# Patient Record
Sex: FEMALE | Race: WHITE | NOT HISPANIC OR LATINO | Employment: FULL TIME | URBAN - METROPOLITAN AREA
[De-identification: names, ages, dates, MRNs, and addresses within clinical notes are randomized per-mention and may not be internally consistent; named-entity substitution may affect disease eponyms.]

---

## 2021-01-25 ENCOUNTER — OFFICE VISIT (OUTPATIENT)
Dept: PAIN MEDICINE | Facility: CLINIC | Age: 36
End: 2021-01-25

## 2021-01-25 VITALS
DIASTOLIC BLOOD PRESSURE: 80 MMHG | WEIGHT: 208 LBS | HEART RATE: 66 BPM | HEIGHT: 67 IN | RESPIRATION RATE: 20 BRPM | BODY MASS INDEX: 32.65 KG/M2 | TEMPERATURE: 96.9 F | SYSTOLIC BLOOD PRESSURE: 120 MMHG | OXYGEN SATURATION: 99 %

## 2021-01-25 DIAGNOSIS — M54.16 LUMBAR RADICULITIS: ICD-10-CM

## 2021-01-25 DIAGNOSIS — M51.36 BULGE OF LUMBAR DISC WITHOUT MYELOPATHY: ICD-10-CM

## 2021-01-25 DIAGNOSIS — M54.16 LUMBAR NERVE ROOT COMPRESSION: Primary | ICD-10-CM

## 2021-01-25 PROBLEM — M51.369 BULGE OF LUMBAR DISC WITHOUT MYELOPATHY: Status: ACTIVE | Noted: 2021-01-25

## 2021-01-25 LAB
POC AMPHETAMINES: NEGATIVE
POC BARBITURATES: NEGATIVE
POC BENZODIAZEPHINES: NEGATIVE
POC COCAINE: NEGATIVE
POC METHADONE: NEGATIVE
POC METHAMPHETAMINE SCREEN URINE: NEGATIVE
POC OPIATES: NEGATIVE
POC OXYCODONE: NEGATIVE
POC PHENCYCLIDINE: NEGATIVE
POC PROPOXYPHENE: NEGATIVE
POC THC: NEGATIVE
POC TRICYCLIC ANTIDEPRESSANTS: NEGATIVE

## 2021-01-25 PROCEDURE — 80305 DRUG TEST PRSMV DIR OPT OBS: CPT | Performed by: NURSE PRACTITIONER

## 2021-01-25 PROCEDURE — 99204 OFFICE O/P NEW MOD 45 MIN: CPT | Performed by: NURSE PRACTITIONER

## 2021-01-25 RX ORDER — MELOXICAM 15 MG/1
TABLET ORAL
COMMUNITY
Start: 2021-01-06 | End: 2023-02-27

## 2021-01-25 RX ORDER — PREDNISONE 20 MG/1
TABLET ORAL
COMMUNITY
Start: 2021-01-03 | End: 2023-02-27

## 2021-01-25 RX ORDER — ALBUTEROL SULFATE 90 UG/1
2 AEROSOL, METERED RESPIRATORY (INHALATION) EVERY 4 HOURS PRN
COMMUNITY

## 2021-01-25 RX ORDER — METAXALONE 800 MG/1
TABLET ORAL
COMMUNITY
Start: 2021-01-03 | End: 2023-02-27

## 2021-01-25 NOTE — PROGRESS NOTES
"CHIEF COMPLAINT  New pt referred from Maribeth Alvarado MD for back pain. Pt sts no back pain, pain starts in right hip and radiates down right leg. Pt sts sharp severe pain when changing positions from sitting to standing, and walking. Pt sts pain started 12/14/20, pt sts she took motrin x 2 weeks for the pain then went to  ER, had XR and was given MDP improved pain x 1 week then saw Dr. Alvarado, had MRI lumbar 1/15/21. Pt sts never had steroid inj for pain. Pt sts no PT.     Subjective   Noelle Rodriguez is a  RHD 35 y.o. female.   She presents to the office for evaluation of back, right hip and right leg pain. She was referred here by Dr Maribeth Alvarado.  Lives with .  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  Does not smoke. Does drink alcohol, 1 glass of wine with dinner 4-5 times per week. Denies any illicit substance use. Denies any family history of alcoholism or addiction. Denies any family history of back problems. Family history is positive for multiple myeloma(mother).    Her pain started approximately 12-14-20. Does not remember any specific injury or traumatic event. Pain continued to worsen. Noted right SI joint pain. Tried Ibuprofen, Yoga, heating pad. Pain worsened in early January. Went to  ER around this time. \"it felt like someone was driving a nail in my hip.\"  Treated with MDP with improvement. Saw Dr Alvarado. Was improving at that time. Also prescribed meloxicam 15 mg daily. No relief with meloxicam. No relief with muscle relaxant.  When steroid pack ended, pain returned to previous pain levels. Dr Alvarado ordered MRI.     Complains of pain in her right low back, right posterior leg. Today her pain is 2/10VAS(currently sitting).  Describes the pain as continuous aching with intermittent sharp and stabbing. The pain can radiate down her right leg. Pain increases with prolonged position, certain positions; pain decreases with stretching, heating pad. No " relief with NSAIDS or muscle relaxants.  Has completed steroids with temporary relief.  Has been trying to perform HEP with temporary relief.  ADL's by self. Denies any bowel or bladder incontinence. Denies any current falls.   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    Has been trying to do home exercises and stretching. Notes temporary mild relief.       Back Pain  This is a new () problem. The current episode started more than 1 month ago. The problem occurs constantly. The problem has been gradually worsening since onset. The pain is present in the lumbar spine. The pain radiates to the right thigh and right knee. The pain is at a severity of 2/10. The pain is moderate. The symptoms are aggravated by bending, position, standing, twisting and sitting. Associated symptoms include headaches (hx migraines), leg pain and numbness. Pertinent negatives include no bladder incontinence, bowel incontinence, chest pain, fever or weakness. She has tried bed rest, chiropractic manipulation, heat, ice, muscle relaxant, NSAIDs and walking for the symptoms. The treatment provided mild relief.      PEG Assessment   What number best describes your pain on average in the past week?5  What number best describes how, during the past week, pain has interfered with your enjoyment of life?5  What number best describes how, during the past week, pain has interfered with your general activity?  5      Current Outpatient Medications:   •  albuterol sulfate  (90 Base) MCG/ACT inhaler, Inhale 2 puffs Every 4 (Four) Hours As Needed for Wheezing., Disp: , Rfl:   •  meloxicam (MOBIC) 15 MG tablet, , Disp: , Rfl:   •  metaxalone (SKELAXIN) 800 MG tablet, , Disp: , Rfl:   •  predniSONE (DELTASONE) 20 MG tablet, , Disp: , Rfl:     The following portions of the patient's history were reviewed and updated as appropriate: allergies, current medications, past family history, past  "medical history, past social history, past surgical history and problem list.    REVIEW OF PERTINENT MEDICAL DATA    Reviewed Dr Alvarado office visit 1-6-21(ORTHO)--patient presents with complaint of pain involving her right SI joint.  This started on December 14, 2020.  There is no traumatic event or change in activity.  Reports pain is getting worse.  Seen in ER over the weekend where they took x-rays of her hip and recommended a Medrol Dosepak.  Did notice improvement with Medrol Dosepak.  She points to her right SI joint where her symptoms are most prevalent.  Plan finish out Medrol Dosepak.  Recommend meloxicam 50 mg once a day.  Plan to see her back if symptoms do not improve.    RAMOS INVENTORY-- 5    Reviewed lumbar MRI--- see below-- reviewed with patient.       Review of Systems   Constitutional: Positive for activity change (dec). Negative for fatigue and fever.   HENT: Negative for congestion.    Eyes: Negative for visual disturbance.   Respiratory: Negative for cough and shortness of breath.    Cardiovascular: Negative for chest pain.   Gastrointestinal: Negative for bowel incontinence, constipation and diarrhea.   Endocrine: Negative for polyuria.   Genitourinary: Negative for bladder incontinence and difficulty urinating.   Musculoskeletal: Positive for arthralgias (right hip) and back pain. Negative for gait problem.   Allergic/Immunologic: Negative for immunocompromised state.   Neurological: Positive for numbness and headaches (hx migraines). Negative for dizziness, weakness and light-headedness.   Psychiatric/Behavioral: Positive for sleep disturbance. Negative for agitation and suicidal ideas. The patient is not nervous/anxious.        Vitals:    01/25/21 1435   BP: 120/80   Pulse: 66   Resp: 20   Temp: 96.9 °F (36.1 °C)   SpO2: 99%   Weight: 94.3 kg (208 lb)   Height: 170.2 cm (67\")   PainSc:   2   PainLoc: Back     Objective   Physical Exam  Vitals signs and nursing note reviewed. "   Constitutional:       Appearance: Normal appearance. She is well-developed.   HENT:      Head: Normocephalic and atraumatic.   Neck:      Musculoskeletal: Full passive range of motion without pain.      Trachea: Trachea normal.   Pulmonary:      Effort: Pulmonary effort is normal.   Musculoskeletal:      Lumbar back: She exhibits decreased range of motion, tenderness and bony tenderness.      Comments: Mild right SI joint tenderness    +SLR on right, negative on left    + femoral stretch on right, negative on left   Skin:     General: Skin is warm and dry.   Neurological:      Mental Status: She is alert.      Gait: Gait normal.      Deep Tendon Reflexes:      Reflex Scores:       Patellar reflexes are 1+ on the right side and 2+ on the left side.       Achilles reflexes are 1+ on the right side and 2+ on the left side.  Psychiatric:         Speech: Speech normal.         Behavior: Behavior normal. Behavior is cooperative.         Thought Content: Thought content normal.         Judgment: Judgment normal.         Assessment/Plan   Diagnoses and all orders for this visit:    1. Lumbar nerve root compression (Primary)  -     Case Request  -     Urine Drug Screen Confirmation - Urine, Clean Catch; Future  -     POC Urine Drug Screen, Triage    2. Lumbar radiculitis  -     Case Request  -     Urine Drug Screen Confirmation - Urine, Clean Catch; Future  -     POC Urine Drug Screen, Triage    3. Bulge of lumbar disc without myelopathy  -     Case Request  -     Urine Drug Screen Confirmation - Urine, Clean Catch; Future  -     POC Urine Drug Screen, Triage        --- Routine UDS in office today as part of monitoring requirements for controlled substances.  The specimen was viewed and the immunoassay result reviewed and is NEGATIVE>.    --- TF JOSE ENRIQUE right L4. No blood thinners. Reviewed the procedure at length with the patient.  Included in the review was expectations, complications, risk and benefits.The procedure was  "described in detail and the risks, benefits and alternatives were discussed with the patient (including but not limited to: bleeding, infection, nerve damage, worsening of pain, inability to perform injection, paralysis, seizures, and death) who agreed to proceed.  Discussed the potential for sedation if warranted/wanted.  The procedure will plan to be performed at [facility] with fluoroscopic guidance(unless ultrasound is indicated) and could potentially have steroids and contrast dye used. Questions were answered and in a way the patient could understand.  Patient verbalized understanding and wishes to proceed.  This intervention will be ordered.  Discussed with patient that all procedures are part of a multimodal plan of care and include either formal PT or a home exercise program.  Patient has no evidence of coagulopathy or current infection.  --- Hold on PT at this time. Pain is significant and interfering with her ADL\"s and ability to work. PLan to proceed with epidural and then proceed with PT once pain is better controlled.  --- Discussed potential need for neurosurgical consultation. Patient wants to wait at this time.   --- Follow-up after procedure or sooner if needed.       JUAN DIEGO REPORT    JUAN DIEGO report has been reviewed and scanned into the patient's chart.    As the clinician, I personally reviewed the JUAN DIEGO from 1-25-21 while the patient was in the office today.            EMR Dragon/Transcription disclaimer:   Much of this encounter note is an electronic transcription/translation of spoken language to printed text. The electronic translation of spoken language may permit erroneous, or at times, nonsensical words or phrases to be inadvertently transcribed; Although I have reviewed the note for such errors, some may still exist.      "

## 2021-01-26 ENCOUNTER — TELEPHONE (OUTPATIENT)
Dept: PAIN MEDICINE | Facility: CLINIC | Age: 36
End: 2021-01-26

## 2021-01-26 NOTE — TELEPHONE ENCOUNTER
Spoke with Latasha at [facility] today she cancelled uds specimen ID: [id number] per your request. Please advise. Thank you.

## 2021-01-28 ENCOUNTER — DOCUMENTATION (OUTPATIENT)
Dept: PAIN MEDICINE | Facility: CLINIC | Age: 36
End: 2021-01-28

## 2021-01-28 ENCOUNTER — OUTSIDE FACILITY SERVICE (OUTPATIENT)
Dept: PAIN MEDICINE | Facility: CLINIC | Age: 36
End: 2021-01-28

## 2021-01-28 PROCEDURE — 64483 NJX AA&/STRD TFRM EPI L/S 1: CPT | Performed by: ANESTHESIOLOGY

## 2021-01-28 NOTE — PROGRESS NOTES
Lumbar Transforaminal Epidural Steroid Injection (one level Unilateral)  [facility]      PREOPERATIVE DIAGNOSIS:  Lumbar Disc Displacement, right Lumbar Radiculopathy and right lumbar root compression    POSTOPERATIVE DIAGNOSIS:  Same as preop diagnosis    PROCEDURE:  CPT 23794 --  Diagnostic Transforaminal Epidural Steroid Injection at the L4 level, on the right     PRE-PROCEDURE DISCUSSION WITH PATIENT:    Risks and complications were discussed with the patient prior to starting the procedure and informed consent was obtained.  We discussed various topics including but not limited to bleeding, infection, injury, nerve injury, paralysis, coma, death, postprocedural painful flare-up, postprocedural site soreness, and a lack of pain relief.  We discussed the diagnostic aspect of transforaminal epidural / selective nerve root blockade.    SURGEON:  Mansoor Pitt MD    REASON FOR PROCEDURE:    Diagnostic injection at this level is needed, Radiating pattern of pain is likely consistent with degenerative changes in the area., Nerve root provocation positivity is noted.   and Acute pain flareup is noted & problematic, and the injection is used to attempt to break the flareup.      SEDATION:  Versed 4mg & Fentanyl 50 mcg IV  ANESTHETIC:  Marcaine 0.25%  STEROID:  Methylprednisolone (DEPO MEDROL) 80mg/ml    DESCRIPTON OF PROCEDURE:  After obtaining informed consent, an I.V. was started in the preoperative area. The patient taken to the operating room and was placed in the prone position with a pillow under the abdomen.  All pressure points were well padded.  EKG, blood pressure, and pulse oximeter were monitored.  The lumbar area was prepped with Chloraprep and draped in a sterile fashion. Under fluoroscopic guidance in an oblique dimension on the above mentioned side, the transverse process of the aforementioned vertebra at the junction of the body at 6 o'clock position was identified. Skin and  subcutaneous tissue was anesthetized with 1% lidocaine. A 22-gauge spinal needle was introduced under fluoroscopic guidance at the above junction into the foramen without parasthesias and into the epidural space. After confirming the position of the needle with PA, lateral, and oblique fluoroscopic views, aspiration was checked and was clear of blood or CSF.  Next, 1 mL of Omnipaque was injected. After seeing adequate spread on the corresponding nerve root, a total volume 3mL of injectate containing 1ml of the above mentioned local anesthetic, 1 ml saline,  and the above mentioned corticosteroid was injected into the epidural space.    The needle was removed intact.  Vital signs were stable throughout.        ESTIMATED BLOOD LOSS:  <5 mL  SPECIMENS:  none    COMPLICATIONS:   No complications were noted., There was no indication of vascular uptake on live injection of contrast dye., There was no indication of intrathecal uptake on live injection of contrast dye., There was not any evidence of dural puncture.   and The patient did not have any signs of postprocedure numbness nor weakness.    TOLERANCE & DISCHARGE CONDITION:    The patient tolerated the procedure well.  The patient was transported to the recovery area without difficulties.  The patient was discharged to home under the care of family in stable and satisfactory condition.    PLAN OF CARE:  1. The patient was given our standard instruction sheet.  2. The patient will Repeat injection in 2-4 wks PRN.  3. The patient will resume all medications as per the medication reconciliation sheet.

## 2021-02-11 ENCOUNTER — TELEPHONE (OUTPATIENT)
Dept: PAIN MEDICINE | Facility: CLINIC | Age: 36
End: 2021-02-11

## 2021-02-11 NOTE — TELEPHONE ENCOUNTER
Caller:ONEYDA RANDLE     Relationship to patient: SELF    Best call back number: [phone]    Chief complaint: REQUESTING SCHEDULE FOR EPIDURAL PROCEDURE- WILL NEED TO BE SCHEDULED AFTER 02/23/21     Type of visit: PROCEDURE    Requested date: ASAP

## 2023-02-27 ENCOUNTER — OFFICE VISIT (OUTPATIENT)
Dept: INTERNAL MEDICINE | Facility: CLINIC | Age: 38
End: 2023-02-27
Payer: COMMERCIAL

## 2023-02-27 VITALS
HEIGHT: 68 IN | WEIGHT: 217.4 LBS | SYSTOLIC BLOOD PRESSURE: 116 MMHG | HEART RATE: 70 BPM | DIASTOLIC BLOOD PRESSURE: 68 MMHG | TEMPERATURE: 97.3 F | RESPIRATION RATE: 16 BRPM | BODY MASS INDEX: 32.95 KG/M2 | OXYGEN SATURATION: 98 %

## 2023-02-27 DIAGNOSIS — E66.9 OBESITY (BMI 30-39.9): Primary | ICD-10-CM

## 2023-02-27 PROCEDURE — 99204 OFFICE O/P NEW MOD 45 MIN: CPT | Performed by: INTERNAL MEDICINE

## 2023-02-27 RX ORDER — TIRZEPATIDE 2.5 MG/.5ML
2.5 INJECTION, SOLUTION SUBCUTANEOUS WEEKLY
Qty: 2 ML | Refills: 0 | Status: SHIPPED | OUTPATIENT
Start: 2023-02-27 | End: 2023-03-07

## 2023-02-27 RX ORDER — SUMATRIPTAN 100 MG/1
100 TABLET, FILM COATED ORAL
COMMUNITY

## 2023-02-28 LAB
ALBUMIN SERPL-MCNC: 4.6 G/DL (ref 3.5–5.2)
ALBUMIN/GLOB SERPL: 1.7 G/DL
ALP SERPL-CCNC: 91 U/L (ref 39–117)
ALT SERPL-CCNC: 24 U/L (ref 1–33)
AST SERPL-CCNC: 15 U/L (ref 1–32)
BASOPHILS # BLD AUTO: 0.04 10*3/MM3 (ref 0–0.2)
BASOPHILS NFR BLD AUTO: 0.5 % (ref 0–1.5)
BILIRUB SERPL-MCNC: 0.3 MG/DL (ref 0–1.2)
BUN SERPL-MCNC: 10 MG/DL (ref 6–20)
BUN/CREAT SERPL: 13.2 (ref 7–25)
CALCIUM SERPL-MCNC: 9.5 MG/DL (ref 8.6–10.5)
CHLORIDE SERPL-SCNC: 101 MMOL/L (ref 98–107)
CHOLEST SERPL-MCNC: 203 MG/DL (ref 0–200)
CO2 SERPL-SCNC: 27.3 MMOL/L (ref 22–29)
CREAT SERPL-MCNC: 0.76 MG/DL (ref 0.57–1)
EGFRCR SERPLBLD CKD-EPI 2021: 103.6 ML/MIN/1.73
EOSINOPHIL # BLD AUTO: 0.12 10*3/MM3 (ref 0–0.4)
EOSINOPHIL NFR BLD AUTO: 1.5 % (ref 0.3–6.2)
ERYTHROCYTE [DISTWIDTH] IN BLOOD BY AUTOMATED COUNT: 13.9 % (ref 12.3–15.4)
GLOBULIN SER CALC-MCNC: 2.7 GM/DL
GLUCOSE SERPL-MCNC: 93 MG/DL (ref 65–99)
HBA1C MFR BLD: 5.7 % (ref 4.8–5.6)
HCT VFR BLD AUTO: 40.4 % (ref 34–46.6)
HDLC SERPL-MCNC: 63 MG/DL (ref 40–60)
HGB BLD-MCNC: 13 G/DL (ref 12–15.9)
IMM GRANULOCYTES # BLD AUTO: 0.02 10*3/MM3 (ref 0–0.05)
IMM GRANULOCYTES NFR BLD AUTO: 0.3 % (ref 0–0.5)
LDLC SERPL CALC-MCNC: 110 MG/DL (ref 0–100)
LYMPHOCYTES # BLD AUTO: 1.69 10*3/MM3 (ref 0.7–3.1)
LYMPHOCYTES NFR BLD AUTO: 21.8 % (ref 19.6–45.3)
MCH RBC QN AUTO: 27.5 PG (ref 26.6–33)
MCHC RBC AUTO-ENTMCNC: 32.2 G/DL (ref 31.5–35.7)
MCV RBC AUTO: 85.4 FL (ref 79–97)
MONOCYTES # BLD AUTO: 0.42 10*3/MM3 (ref 0.1–0.9)
MONOCYTES NFR BLD AUTO: 5.4 % (ref 5–12)
NEUTROPHILS # BLD AUTO: 5.47 10*3/MM3 (ref 1.7–7)
NEUTROPHILS NFR BLD AUTO: 70.5 % (ref 42.7–76)
NRBC BLD AUTO-RTO: 0 /100 WBC (ref 0–0.2)
PLATELET # BLD AUTO: 302 10*3/MM3 (ref 140–450)
POTASSIUM SERPL-SCNC: 4.7 MMOL/L (ref 3.5–5.2)
PROT SERPL-MCNC: 7.3 G/DL (ref 6–8.5)
RBC # BLD AUTO: 4.73 10*6/MM3 (ref 3.77–5.28)
SODIUM SERPL-SCNC: 139 MMOL/L (ref 136–145)
T4 FREE SERPL-MCNC: 1.21 NG/DL (ref 0.93–1.7)
TRIGL SERPL-MCNC: 174 MG/DL (ref 0–150)
TSH SERPL DL<=0.005 MIU/L-ACNC: 1.3 UIU/ML (ref 0.27–4.2)
VLDLC SERPL CALC-MCNC: 30 MG/DL (ref 5–40)
WBC # BLD AUTO: 7.76 10*3/MM3 (ref 3.4–10.8)

## 2023-03-02 ENCOUNTER — TELEPHONE (OUTPATIENT)
Dept: INTERNAL MEDICINE | Facility: CLINIC | Age: 38
End: 2023-03-02
Payer: COMMERCIAL

## 2023-03-02 NOTE — TELEPHONE ENCOUNTER
I did a PA for this patient's Mounjaro and insurance denied it.  Not sure if you want to change the medication or what.    Thank you

## 2023-03-07 DIAGNOSIS — E66.9 OBESITY (BMI 30-39.9): Primary | ICD-10-CM

## 2023-03-07 RX ORDER — SEMAGLUTIDE 0.25 MG/.5ML
0.25 INJECTION, SOLUTION SUBCUTANEOUS WEEKLY
Qty: 2 ML | Refills: 0 | Status: SHIPPED | OUTPATIENT
Start: 2023-03-07

## 2023-04-12 DIAGNOSIS — E66.9 OBESITY (BMI 30-39.9): ICD-10-CM

## 2023-04-12 RX ORDER — SEMAGLUTIDE 0.25 MG/.5ML
0.25 INJECTION, SOLUTION SUBCUTANEOUS WEEKLY
Qty: 2 ML | Refills: 0 | Status: SHIPPED | OUTPATIENT
Start: 2023-04-12 | End: 2023-04-12

## 2023-04-12 RX ORDER — SEMAGLUTIDE 0.5 MG/.5ML
0.5 INJECTION, SOLUTION SUBCUTANEOUS WEEKLY
Qty: 2 ML | Refills: 0 | Status: SHIPPED | OUTPATIENT
Start: 2023-04-12

## 2023-05-22 DIAGNOSIS — E66.9 OBESITY (BMI 30-39.9): ICD-10-CM

## 2023-05-22 RX ORDER — SEMAGLUTIDE 0.5 MG/.5ML
INJECTION, SOLUTION SUBCUTANEOUS
Qty: 2 ML | Refills: 0 | Status: SHIPPED | OUTPATIENT
Start: 2023-05-22

## 2023-05-22 NOTE — TELEPHONE ENCOUNTER
Rx Refill Note  Requested Prescriptions     Pending Prescriptions Disp Refills   • Wegovy 0.5 MG/0.5ML solution auto-injector [Pharmacy Med Name: WEGOVY 0.5MG/0.5ML PF PEN INJ] 2 mL 0     Sig: ADMINISTER 0.5 MG(0.5 ML) UNDER THE SKIN 1 TIME EVERY WEEK AS DIRECTED      Last office visit with prescribing clinician: 2/27/2023   Last telemedicine visit with prescribing clinician: 3/2/2023   Next office visit with prescribing clinician: Visit date not found                         Would you like a call back once the refill request has been completed: [] Yes [] No    If the office needs to give you a call back, can they leave a voicemail: [] Yes [] No    Maribeth Weller MA  05/22/23, 08:54 EDT

## 2023-07-21 DIAGNOSIS — E66.9 OBESITY (BMI 30-39.9): ICD-10-CM

## 2023-07-24 RX ORDER — SEMAGLUTIDE 0.5 MG/.5ML
0.5 INJECTION, SOLUTION SUBCUTANEOUS WEEKLY
Qty: 2 ML | Refills: 0 | Status: SHIPPED | OUTPATIENT
Start: 2023-07-24

## 2023-08-07 RX ORDER — SEMAGLUTIDE 1 MG/.5ML
1 INJECTION, SOLUTION SUBCUTANEOUS WEEKLY
Qty: 6 ML | Refills: 1 | Status: SHIPPED | OUTPATIENT
Start: 2023-08-07 | End: 2023-08-10

## 2023-08-10 RX ORDER — SEMAGLUTIDE 1.7 MG/.75ML
1.7 INJECTION, SOLUTION SUBCUTANEOUS WEEKLY
Qty: 3 ML | Refills: 1 | Status: SHIPPED | OUTPATIENT
Start: 2023-08-10

## 2023-10-05 RX ORDER — SEMAGLUTIDE 1.7 MG/.75ML
INJECTION, SOLUTION SUBCUTANEOUS
Qty: 3 ML | Refills: 1 | Status: SHIPPED | OUTPATIENT
Start: 2023-10-05

## 2023-10-05 NOTE — TELEPHONE ENCOUNTER
Rx Refill Note  Requested Prescriptions     Pending Prescriptions Disp Refills    Wegovy 1.7 MG/0.75ML solution auto-injector [Pharmacy Med Name: WEGOVY 1.7MG/0.75ML PF PEN INJ] 3 mL 1     Sig: ADMINISTER 0.75 ML UNDER THE SKIN 1 TIME EVERY WEEK AS DIRECTED      Last office visit with prescribing clinician: 2/27/2023   Last telemedicine visit with prescribing clinician: Visit date not found   Next office visit with prescribing clinician: Visit date not found                         Would you like a call back once the refill request has been completed: [] Yes [] No    If the office needs to give you a call back, can they leave a voicemail: [] Yes [] No    Maribeth Weller MA  10/05/23, 08:40 EDT

## 2023-12-18 RX ORDER — SEMAGLUTIDE 1.7 MG/.75ML
INJECTION, SOLUTION SUBCUTANEOUS
Qty: 3 ML | Refills: 1 | Status: SHIPPED | OUTPATIENT
Start: 2023-12-18 | End: 2023-12-20 | Stop reason: SDUPTHER

## 2023-12-20 ENCOUNTER — OFFICE VISIT (OUTPATIENT)
Dept: INTERNAL MEDICINE | Facility: CLINIC | Age: 38
End: 2023-12-20
Payer: COMMERCIAL

## 2023-12-20 DIAGNOSIS — E78.5 HYPERLIPIDEMIA, UNSPECIFIED HYPERLIPIDEMIA TYPE: ICD-10-CM

## 2023-12-20 DIAGNOSIS — E66.9 OBESITY (BMI 30-39.9): Primary | ICD-10-CM

## 2023-12-20 DIAGNOSIS — R73.03 PREDIABETES: ICD-10-CM

## 2023-12-20 DIAGNOSIS — L70.9 ACNE, UNSPECIFIED ACNE TYPE: ICD-10-CM

## 2023-12-20 RX ORDER — SEMAGLUTIDE 1.7 MG/.75ML
1.7 INJECTION, SOLUTION SUBCUTANEOUS WEEKLY
Qty: 3 ML | Refills: 1 | Status: SHIPPED | OUTPATIENT
Start: 2023-12-20

## 2023-12-20 RX ORDER — TRETINOIN 0.5 MG/G
1 CREAM TOPICAL NIGHTLY
Qty: 20 G | Refills: 11 | Status: SHIPPED | OUTPATIENT
Start: 2023-12-20

## 2023-12-21 VITALS
BODY MASS INDEX: 27.28 KG/M2 | HEART RATE: 91 BPM | DIASTOLIC BLOOD PRESSURE: 80 MMHG | TEMPERATURE: 97.2 F | HEIGHT: 68 IN | RESPIRATION RATE: 16 BRPM | OXYGEN SATURATION: 99 % | WEIGHT: 180 LBS | SYSTOLIC BLOOD PRESSURE: 110 MMHG

## 2023-12-21 LAB
ALBUMIN SERPL-MCNC: 4.4 G/DL (ref 3.5–5.2)
ALBUMIN/GLOB SERPL: 1.6 G/DL
ALP SERPL-CCNC: 77 U/L (ref 39–117)
ALT SERPL-CCNC: 19 U/L (ref 1–33)
AST SERPL-CCNC: 15 U/L (ref 1–32)
BILIRUB SERPL-MCNC: 0.2 MG/DL (ref 0–1.2)
BUN SERPL-MCNC: 8 MG/DL (ref 6–20)
BUN/CREAT SERPL: 10.7 (ref 7–25)
CALCIUM SERPL-MCNC: 9.5 MG/DL (ref 8.6–10.5)
CHLORIDE SERPL-SCNC: 101 MMOL/L (ref 98–107)
CHOLEST SERPL-MCNC: 179 MG/DL (ref 0–200)
CO2 SERPL-SCNC: 26.6 MMOL/L (ref 22–29)
CREAT SERPL-MCNC: 0.75 MG/DL (ref 0.57–1)
EGFRCR SERPLBLD CKD-EPI 2021: 104.7 ML/MIN/1.73
GLOBULIN SER CALC-MCNC: 2.7 GM/DL
GLUCOSE SERPL-MCNC: 81 MG/DL (ref 65–99)
HBA1C MFR BLD: 5.1 % (ref 4.8–5.6)
HDLC SERPL-MCNC: 80 MG/DL (ref 40–60)
LDLC SERPL CALC-MCNC: 86 MG/DL (ref 0–100)
POTASSIUM SERPL-SCNC: 4.3 MMOL/L (ref 3.5–5.2)
PROT SERPL-MCNC: 7.1 G/DL (ref 6–8.5)
SODIUM SERPL-SCNC: 138 MMOL/L (ref 136–145)
TRIGL SERPL-MCNC: 70 MG/DL (ref 0–150)
VLDLC SERPL CALC-MCNC: 13 MG/DL (ref 5–40)

## 2023-12-21 NOTE — PROGRESS NOTES
"Chief Complaint  Follow-up (Flu medication )    Subjective        Noelle Rodriguez presents to [facility]  History of Present Illness  Here for follow up weight management  Doing excellent on wegovy. Doing ~0.85mg weekly   Down almost 40 lbs since starting back in late Feb.   No major side effects  Has been consistent with dietary and exercise changes  No nausea/vomiting/abd pain, no neck masses  BMI down from ~34 to ~28    Objective   Vital Signs:  /80   Pulse 91   Temp 97.2 °F (36.2 °C) (Infrared)   Resp 16   Ht 171.5 cm (67.5\")   Wt 81.6 kg (180 lb)   SpO2 99%   BMI 27.78 kg/m²   Estimated body mass index is 27.78 kg/m² as calculated from the following:    Height as of this encounter: 171.5 cm (67.5\").    Weight as of this encounter: 81.6 kg (180 lb).         Physical Exam  Vitals and nursing note reviewed.   Constitutional:       General: She is not in acute distress.     Appearance: Normal appearance.   HENT:      Head: Normocephalic and atraumatic.      Right Ear: External ear normal.      Left Ear: External ear normal.      Nose: Nose normal. No congestion.      Mouth/Throat:      Mouth: Mucous membranes are moist.      Pharynx: No oropharyngeal exudate or posterior oropharyngeal erythema.   Eyes:      Extraocular Movements: Extraocular movements intact.      Conjunctiva/sclera: Conjunctivae normal.      Pupils: Pupils are equal, round, and reactive to light.   Cardiovascular:      Rate and Rhythm: Normal rate and regular rhythm.      Pulses: Normal pulses.      Heart sounds: Normal heart sounds. No murmur heard.  Pulmonary:      Effort: Pulmonary effort is normal. No respiratory distress.      Breath sounds: Normal breath sounds. No wheezing or rales.   Musculoskeletal:      Cervical back: Normal range of motion.   Skin:     General: Skin is warm.      Capillary Refill: Capillary refill takes less than 2 seconds.   Neurological:      General: No " focal deficit present.      Mental Status: She is alert and oriented to person, place, and time. Mental status is at baseline.      Cranial Nerves: No cranial nerve deficit.   Psychiatric:         Mood and Affect: Mood normal.         Behavior: Behavior normal.         Thought Content: Thought content normal.        Result Review :  The following data was reviewed by: Jacob Jones MD on 12/20/2023:  Common labs          2/27/2023    13:49 12/20/2023    16:32   Common Labs   Glucose 93  81    BUN 10  8    Creatinine 0.76  0.75    Sodium 139  138    Potassium 4.7  4.3    Chloride 101  101    Calcium 9.5  9.5    Total Protein 7.3  7.1    Albumin 4.6  4.4    Total Bilirubin 0.3  0.2    Alkaline Phosphatase 91  77    AST (SGOT) 15  15    ALT (SGPT) 24  19    WBC 7.76     Hemoglobin 13.0     Hematocrit 40.4     Platelets 302     Total Cholesterol 203  179    Triglycerides 174  70    HDL Cholesterol 63  80    LDL Cholesterol  110  86    Hemoglobin A1C 5.70  5.10      Data reviewed : prior office notes reviewed             Assessment and Plan   Diagnoses and all orders for this visit:    1. Obesity (BMI 30-39.9) (Primary)  -     Semaglutide-Weight Management (Wegovy) 1.7 MG/0.75ML solution auto-injector; Inject 0.75 mL under the skin into the appropriate area as directed 1 (One) Time Per Week.  Dispense: 3 mL; Refill: 1  -     Comprehensive metabolic panel  -     Hemoglobin A1c  -     Lipid panel    2. Acne, unspecified acne type  -     tretinoin (Retin-A) 0.05 % cream; Apply 1 application  topically to the appropriate area as directed Every Night.  Dispense: 20 g; Refill: 11    3. Prediabetes  -     Hemoglobin A1c    4. Hyperlipidemia, unspecified hyperlipidemia type  -     Lipid panel    Other orders  -     Tdap Vaccine => 6yo IM (BOOSTRIX)    Check labs as above for ongoing monitoring while on wegovy  Plan to continue at 1.7mg dose weekly, can inc to 2.4mg weekly after 4 weeks at 1.7mg weekly.        I spent 20 minutes  caring for Noelle on this date of service. This time includes time spent by me in the following activities:preparing for the visit, reviewing tests, obtaining and/or reviewing a separately obtained history, performing a medically appropriate examination and/or evaluation , counseling and educating the patient/family/caregiver, ordering medications, tests, or procedures, and documenting information in the medical record  Follow Up   Return for Next scheduled follow up.  Patient was given instructions and counseling regarding her condition or for health maintenance advice. Please see specific information pulled into the AVS if appropriate.     Jacob Jones MD  [facility]  [address]  Phone: [phone]

## 2024-01-29 RX ORDER — SEMAGLUTIDE 2.4 MG/.75ML
2.4 INJECTION, SOLUTION SUBCUTANEOUS WEEKLY
Qty: 3 ML | Refills: 5 | Status: SHIPPED | OUTPATIENT
Start: 2024-01-29

## 2024-06-24 RX ORDER — SEMAGLUTIDE 2.4 MG/.75ML
INJECTION, SOLUTION SUBCUTANEOUS
Qty: 3 ML | Refills: 5 | Status: SHIPPED | OUTPATIENT
Start: 2024-06-24

## 2024-11-15 RX ORDER — ALBUTEROL SULFATE 90 UG/1
2 INHALANT RESPIRATORY (INHALATION) EVERY 4 HOURS PRN
Qty: 18 G | Refills: 11 | Status: SHIPPED | OUTPATIENT
Start: 2024-11-15

## 2024-11-15 RX ORDER — ONDANSETRON 4 MG/1
4 TABLET, ORALLY DISINTEGRATING ORAL EVERY 8 HOURS PRN
Qty: 15 TABLET | Refills: 3 | Status: SHIPPED | OUTPATIENT
Start: 2024-11-15

## 2024-12-09 RX ORDER — SEMAGLUTIDE 2.4 MG/.75ML
INJECTION, SOLUTION SUBCUTANEOUS
Qty: 3 ML | Refills: 5 | Status: SHIPPED | OUTPATIENT
Start: 2024-12-09

## 2025-01-07 ENCOUNTER — TELEPHONE (OUTPATIENT)
Dept: INTERNAL MEDICINE | Facility: CLINIC | Age: 40
End: 2025-01-07
Payer: COMMERCIAL

## 2025-02-12 RX ORDER — TRETINOIN 1 MG/G
1 CREAM TOPICAL NIGHTLY
Qty: 45 G | Refills: 5 | Status: SHIPPED | OUTPATIENT
Start: 2025-02-12

## 2025-04-07 ENCOUNTER — OFFICE VISIT (OUTPATIENT)
Dept: INTERNAL MEDICINE | Facility: CLINIC | Age: 40
End: 2025-04-07
Payer: COMMERCIAL

## 2025-04-07 VITALS
SYSTOLIC BLOOD PRESSURE: 110 MMHG | HEART RATE: 65 BPM | BODY MASS INDEX: 21.37 KG/M2 | WEIGHT: 141 LBS | OXYGEN SATURATION: 100 % | RESPIRATION RATE: 16 BRPM | DIASTOLIC BLOOD PRESSURE: 64 MMHG | HEIGHT: 68 IN

## 2025-04-07 DIAGNOSIS — R73.03 PREDIABETES: ICD-10-CM

## 2025-04-07 DIAGNOSIS — L65.0 TELOGEN EFFLUVIUM: ICD-10-CM

## 2025-04-07 DIAGNOSIS — E78.5 HYPERLIPIDEMIA, UNSPECIFIED HYPERLIPIDEMIA TYPE: Primary | ICD-10-CM

## 2025-04-07 DIAGNOSIS — J45.20 MILD INTERMITTENT ASTHMA, UNSPECIFIED WHETHER COMPLICATED: ICD-10-CM

## 2025-04-07 DIAGNOSIS — L65.9 HAIR LOSS: ICD-10-CM

## 2025-04-07 DIAGNOSIS — Z11.59 ENCOUNTER FOR HEPATITIS C SCREENING TEST FOR LOW RISK PATIENT: ICD-10-CM

## 2025-04-07 DIAGNOSIS — Z76.89 ENCOUNTER FOR WEIGHT MANAGEMENT: ICD-10-CM

## 2025-04-07 DIAGNOSIS — E55.9 VITAMIN D DEFICIENCY: ICD-10-CM

## 2025-04-07 DIAGNOSIS — Z00.00 ENCOUNTER FOR ROUTINE ADULT HEALTH EXAMINATION WITHOUT ABNORMAL FINDINGS: ICD-10-CM

## 2025-04-07 RX ORDER — MINOXIDIL 2.5 MG/1
2.5 TABLET ORAL DAILY
Qty: 30 TABLET | Refills: 2 | Status: SHIPPED | OUTPATIENT
Start: 2025-04-07 | End: 2025-04-07

## 2025-04-07 RX ORDER — SEMAGLUTIDE 1 MG/.5ML
1 INJECTION, SOLUTION SUBCUTANEOUS WEEKLY
Qty: 2 ML | Refills: 2 | Status: SHIPPED | OUTPATIENT
Start: 2025-04-07

## 2025-04-07 RX ORDER — FLUTICASONE PROPIONATE AND SALMETEROL 100; 50 UG/1; UG/1
1 POWDER RESPIRATORY (INHALATION)
Qty: 60 EACH | Refills: 5 | Status: SHIPPED | OUTPATIENT
Start: 2025-04-07

## 2025-04-07 RX ORDER — ALBUTEROL SULFATE 90 UG/1
2 INHALANT RESPIRATORY (INHALATION) EVERY 4 HOURS PRN
Qty: 18 G | Refills: 11 | Status: SHIPPED | OUTPATIENT
Start: 2025-04-07

## 2025-04-07 RX ORDER — MINOXIDIL 2.5 MG/1
2.5 TABLET ORAL DAILY
Qty: 90 TABLET | Refills: 3 | Status: SHIPPED | OUTPATIENT
Start: 2025-04-07

## 2025-04-07 NOTE — PROGRESS NOTES
"Chief Complaint   Patient presents with    Annual Exam     Wellness Exam       Subjective   Noelle Rodriguez is a 40 y.o. female here for   Chief Complaint   Patient presents with    Annual Exam     Wellness Exam   .    History of Present Illness   Here for annual exam   Doing great, has lost 80 lbs or so on wegovy with of course diet and exercise   She is weaning down on the dose now  Not many GI s/e, she has had hair loss as seen commonly with weight loss and the medication. She is interested in treatment for that.   Does occasionally get asthma flares, got first viral URI in quite sometime this year and usually tobacco smoke triggers       The following portions of the patient's history were reviewed and updated as appropriate: allergies, current medications, past family history, past medical history, past social history, past surgical history, and problem list.    Review of Systems   All other systems reviewed and are negative.      Body mass index is 21.76 kg/m².   Vitals:    04/07/25 0948   BP: 110/64   Pulse: 65   Resp: 16   SpO2: 100%   Weight: 64 kg (141 lb)   Height: 171.5 cm (67.5\")        Objective   Physical Exam  Vitals and nursing note reviewed.   Constitutional:       General: She is not in acute distress.     Appearance: Normal appearance.   HENT:      Head: Normocephalic and atraumatic.      Right Ear: Tympanic membrane and ear canal normal.      Left Ear: Tympanic membrane and ear canal normal.      Nose: Nose normal. No congestion.      Mouth/Throat:      Mouth: Mucous membranes are moist.      Pharynx: No oropharyngeal exudate or posterior oropharyngeal erythema.   Eyes:      Extraocular Movements: Extraocular movements intact.      Conjunctiva/sclera: Conjunctivae normal.      Pupils: Pupils are equal, round, and reactive to light.   Cardiovascular:      Rate and Rhythm: Normal rate and regular rhythm.      Pulses: Normal pulses.      Heart sounds: Normal heart sounds. No murmur " heard.  Pulmonary:      Effort: Pulmonary effort is normal. No respiratory distress.      Breath sounds: Normal breath sounds. No wheezing or rales.   Abdominal:      General: Abdomen is flat. Bowel sounds are normal. There is no distension.      Palpations: Abdomen is soft.      Tenderness: There is no abdominal tenderness.   Musculoskeletal:         General: Normal range of motion.      Cervical back: Normal range of motion and neck supple. No rigidity.      Right lower leg: No edema.      Left lower leg: No edema.   Lymphadenopathy:      Cervical: No cervical adenopathy.   Skin:     General: Skin is warm.      Capillary Refill: Capillary refill takes less than 2 seconds.      Findings: No rash.   Neurological:      General: No focal deficit present.      Mental Status: She is alert and oriented to person, place, and time. Mental status is at baseline.      Cranial Nerves: No cranial nerve deficit.   Psychiatric:         Mood and Affect: Mood normal.         Behavior: Behavior normal.         Thought Content: Thought content normal.         Lab Results   Component Value Date    HGBA1C 5.10 12/20/2023    TSH 1.300 02/27/2023        Assessment & Plan   Problems Addressed this Visit    None  Visit Diagnoses         Hyperlipidemia, unspecified hyperlipidemia type    -  Primary    Relevant Orders    Lipid panel    TSH    T4, free      Prediabetes        Relevant Orders    Hemoglobin A1c      Encounter for routine adult health examination without abnormal findings        Relevant Orders    CBC w AUTO Differential    Comprehensive metabolic panel    Lipid panel    Hemoglobin A1c    TSH    T4, free    Vitamin D 25 hydroxy      Vitamin D deficiency        Relevant Orders    Vitamin D 25 hydroxy      Encounter for weight management        Relevant Medications    Semaglutide-Weight Management (Wegovy) 1 MG/0.5ML solution auto-injector      Encounter for hepatitis C screening test for low risk patient        Relevant Orders     Hepatitis C Antibody      Telogen effluvium          Mild intermittent asthma, unspecified whether complicated        Relevant Medications    Fluticasone-Salmeterol (ADVAIR/WIXELA) 100-50 MCG/ACT DISKUS    albuterol sulfate  (90 Base) MCG/ACT inhaler          Diagnoses         Codes Comments      Hyperlipidemia, unspecified hyperlipidemia type    -  Primary ICD-10-CM: E78.5  ICD-9-CM: 272.4       Prediabetes     ICD-10-CM: R73.03  ICD-9-CM: 790.29       Encounter for routine adult health examination without abnormal findings     ICD-10-CM: Z00.00  ICD-9-CM: V70.0       Vitamin D deficiency     ICD-10-CM: E55.9  ICD-9-CM: 268.9       Encounter for weight management     ICD-10-CM: Z76.89  ICD-9-CM: V65.49       Encounter for hepatitis C screening test for low risk patient     ICD-10-CM: Z11.59  ICD-9-CM: V73.89       Telogen effluvium     ICD-10-CM: L65.0  ICD-9-CM: 704.02       Mild intermittent asthma, unspecified whether complicated     ICD-10-CM: J45.20  ICD-9-CM: 493.90           Annual exam- discussed routine screening, health maintenance, immunizations, etc.   Mammogram UTD  Due for PAP with GYN, she will schedule  Check labs for ongoing monitoring  Weight management - has done very well with wegovy, she has started to decrease dose, cut to 1mg weekly and can further decrease depending on weight maintenance.   TE - discussed starting oral minoxidil 2.5mg daily, can increase pending response    F/u 1 year    Jacob Jones MD  [facility]  Phone: [phone]

## 2025-04-08 LAB
25(OH)D3+25(OH)D2 SERPL-MCNC: 29.4 NG/ML (ref 30–100)
ALBUMIN SERPL-MCNC: 4.2 G/DL (ref 3.5–5.2)
ALBUMIN/GLOB SERPL: 1.4 G/DL
ALP SERPL-CCNC: 62 U/L (ref 39–117)
ALT SERPL-CCNC: 16 U/L (ref 1–33)
AST SERPL-CCNC: 17 U/L (ref 1–32)
BASOPHILS # BLD AUTO: 0.04 10*3/MM3 (ref 0–0.2)
BASOPHILS NFR BLD AUTO: 0.5 % (ref 0–1.5)
BILIRUB SERPL-MCNC: 0.7 MG/DL (ref 0–1.2)
BUN SERPL-MCNC: 9 MG/DL (ref 6–20)
BUN/CREAT SERPL: 13.6 (ref 7–25)
CALCIUM SERPL-MCNC: 9.4 MG/DL (ref 8.6–10.5)
CHLORIDE SERPL-SCNC: 100 MMOL/L (ref 98–107)
CHOLEST SERPL-MCNC: 170 MG/DL (ref 0–200)
CO2 SERPL-SCNC: 26 MMOL/L (ref 22–29)
CREAT SERPL-MCNC: 0.66 MG/DL (ref 0.57–1)
EGFRCR SERPLBLD CKD-EPI 2021: 113.9 ML/MIN/1.73
EOSINOPHIL # BLD AUTO: 0.09 10*3/MM3 (ref 0–0.4)
EOSINOPHIL NFR BLD AUTO: 1.1 % (ref 0.3–6.2)
ERYTHROCYTE [DISTWIDTH] IN BLOOD BY AUTOMATED COUNT: 12.5 % (ref 12.3–15.4)
GLOBULIN SER CALC-MCNC: 3 GM/DL
GLUCOSE SERPL-MCNC: 98 MG/DL (ref 65–99)
HBA1C MFR BLD: 4.7 % (ref 4.8–5.6)
HCT VFR BLD AUTO: 42 % (ref 34–46.6)
HCV IGG SERPL QL IA: NON REACTIVE
HDLC SERPL-MCNC: 81 MG/DL (ref 40–60)
HGB BLD-MCNC: 13.4 G/DL (ref 12–15.9)
IMM GRANULOCYTES # BLD AUTO: 0.03 10*3/MM3 (ref 0–0.05)
IMM GRANULOCYTES NFR BLD AUTO: 0.4 % (ref 0–0.5)
LDLC SERPL CALC-MCNC: 73 MG/DL (ref 0–100)
LYMPHOCYTES # BLD AUTO: 1.24 10*3/MM3 (ref 0.7–3.1)
LYMPHOCYTES NFR BLD AUTO: 14.9 % (ref 19.6–45.3)
MCH RBC QN AUTO: 30 PG (ref 26.6–33)
MCHC RBC AUTO-ENTMCNC: 31.9 G/DL (ref 31.5–35.7)
MCV RBC AUTO: 94 FL (ref 79–97)
MONOCYTES # BLD AUTO: 0.39 10*3/MM3 (ref 0.1–0.9)
MONOCYTES NFR BLD AUTO: 4.7 % (ref 5–12)
NEUTROPHILS # BLD AUTO: 6.55 10*3/MM3 (ref 1.7–7)
NEUTROPHILS NFR BLD AUTO: 78.4 % (ref 42.7–76)
NRBC BLD AUTO-RTO: 0 /100 WBC (ref 0–0.2)
PLATELET # BLD AUTO: 283 10*3/MM3 (ref 140–450)
POTASSIUM SERPL-SCNC: 4.3 MMOL/L (ref 3.5–5.2)
PROT SERPL-MCNC: 7.2 G/DL (ref 6–8.5)
RBC # BLD AUTO: 4.47 10*6/MM3 (ref 3.77–5.28)
SODIUM SERPL-SCNC: 140 MMOL/L (ref 136–145)
T4 FREE SERPL-MCNC: 1.33 NG/DL (ref 0.92–1.68)
TRIGL SERPL-MCNC: 85 MG/DL (ref 0–150)
TSH SERPL DL<=0.005 MIU/L-ACNC: 0.85 UIU/ML (ref 0.27–4.2)
VLDLC SERPL CALC-MCNC: 16 MG/DL (ref 5–40)
WBC # BLD AUTO: 8.34 10*3/MM3 (ref 3.4–10.8)

## 2025-06-12 RX ORDER — SEMAGLUTIDE 1.7 MG/.75ML
1.7 INJECTION, SOLUTION SUBCUTANEOUS WEEKLY
Qty: 2 ML | Refills: 2 | Status: SHIPPED | OUTPATIENT
Start: 2025-06-12

## 2025-07-18 RX ORDER — FLUCONAZOLE 150 MG/1
TABLET ORAL
Qty: 2 TABLET | Refills: 0 | Status: SHIPPED | OUTPATIENT
Start: 2025-07-18

## 2025-08-13 ENCOUNTER — TELEPHONE (OUTPATIENT)
Dept: INTERNAL MEDICINE | Facility: CLINIC | Age: 40
End: 2025-08-13
Payer: COMMERCIAL

## 2025-08-15 DIAGNOSIS — L65.9 HAIR LOSS: ICD-10-CM

## 2025-08-15 RX ORDER — MINOXIDIL 2.5 MG/1
2.5 TABLET ORAL DAILY
Qty: 90 TABLET | Refills: 3 | Status: SHIPPED | OUTPATIENT
Start: 2025-08-15